# Patient Record
Sex: MALE | Race: WHITE
[De-identification: names, ages, dates, MRNs, and addresses within clinical notes are randomized per-mention and may not be internally consistent; named-entity substitution may affect disease eponyms.]

---

## 2019-06-19 ENCOUNTER — HOSPITAL ENCOUNTER (OUTPATIENT)
Dept: HOSPITAL 94 - RAD | Age: 59
Discharge: HOME | End: 2019-06-19
Attending: INTERNAL MEDICINE
Payer: MEDICAID

## 2019-06-19 DIAGNOSIS — R13.12: Primary | ICD-10-CM

## 2019-06-19 PROCEDURE — 74230 X-RAY XM SWLNG FUNCJ C+: CPT

## 2020-10-07 ENCOUNTER — HOSPITAL ENCOUNTER (OUTPATIENT)
Dept: HOSPITAL 94 - RAD | Age: 60
Discharge: HOME | End: 2020-10-07
Attending: INTERNAL MEDICINE
Payer: MEDICAID

## 2020-10-07 DIAGNOSIS — R13.12: Primary | ICD-10-CM

## 2020-10-07 PROCEDURE — 74230 X-RAY XM SWLNG FUNCJ C+: CPT

## 2020-12-08 ENCOUNTER — HOSPITAL ENCOUNTER (INPATIENT)
Dept: HOSPITAL 94 - ER | Age: 60
LOS: 2 days | Discharge: NURSING, REHAB OR LONG-TERM CARE | DRG: 137 | End: 2020-12-10
Attending: HOSPITALIST | Admitting: SPECIALIST
Payer: MEDICAID

## 2020-12-08 VITALS — WEIGHT: 147.27 LBS | HEIGHT: 70 IN | BODY MASS INDEX: 21.08 KG/M2

## 2020-12-08 VITALS — SYSTOLIC BLOOD PRESSURE: 136 MMHG | DIASTOLIC BLOOD PRESSURE: 81 MMHG

## 2020-12-08 DIAGNOSIS — E78.00: ICD-10-CM

## 2020-12-08 DIAGNOSIS — U07.1: Primary | ICD-10-CM

## 2020-12-08 DIAGNOSIS — F20.9: ICD-10-CM

## 2020-12-08 DIAGNOSIS — Z79.899: ICD-10-CM

## 2020-12-08 DIAGNOSIS — R09.02: ICD-10-CM

## 2020-12-08 DIAGNOSIS — J12.89: ICD-10-CM

## 2020-12-08 DIAGNOSIS — N17.9: ICD-10-CM

## 2020-12-08 DIAGNOSIS — E03.9: ICD-10-CM

## 2020-12-08 DIAGNOSIS — R13.10: ICD-10-CM

## 2020-12-08 LAB
ALBUMIN SERPL BCP-MCNC: 2.4 G/DL (ref 3.4–5)
ALBUMIN/GLOB SERPL: 0.5 {RATIO} (ref 1.1–1.5)
ALP SERPL-CCNC: 120 IU/L (ref 46–116)
ALT SERPL W P-5'-P-CCNC: 21 U/L (ref 12–78)
ANION GAP SERPL CALCULATED.3IONS-SCNC: 7 MMOL/L (ref 8–16)
AST SERPL W P-5'-P-CCNC: 80 U/L (ref 10–37)
BASOPHILS # BLD AUTO: 0 X10'3 (ref 0–0.2)
BASOPHILS NFR BLD AUTO: 0.1 % (ref 0–1)
BILIRUB SERPL-MCNC: 0.3 MG/DL (ref 0.1–1)
BUN SERPL-MCNC: 70 MG/DL (ref 7–18)
BUN/CREAT SERPL: 35.2 (ref 5.4–32)
CALCIUM SERPL-MCNC: 9 MG/DL (ref 8.5–10.1)
CHLORIDE SERPL-SCNC: 110 MMOL/L (ref 99–107)
CO2 SERPL-SCNC: 30.7 MMOL/L (ref 24–32)
CREAT SERPL-MCNC: 1.99 MG/DL (ref 0.6–1.1)
CRP SERPL HS-MCNC: 25.36 MG/DL (ref 0–0.5)
EOSINOPHIL # BLD AUTO: 0 X10'3 (ref 0–0.9)
EOSINOPHIL NFR BLD AUTO: 0.1 % (ref 0–6)
ERYTHROCYTE [DISTWIDTH] IN BLOOD BY AUTOMATED COUNT: 15.8 % (ref 11.5–14.5)
FERRITIN SERPL-MCNC: 356 NG/ML (ref 26–388)
GFR SERPL CREATININE-BSD FRML MDRD: 34 ML/MIN
GLUCOSE SERPL-MCNC: 113 MG/DL (ref 70–104)
HCT VFR BLD AUTO: 32.9 % (ref 42–52)
HGB BLD-MCNC: 10.8 G/DL (ref 14–17.9)
LDH SERPL-CCNC: 211 U/L (ref 85–227)
LYMPHOCYTES # BLD AUTO: 0.5 X10'3 (ref 1.1–4.8)
LYMPHOCYTES NFR BLD AUTO: 4.6 % (ref 21–51)
MCH RBC QN AUTO: 29.2 PG (ref 27–31)
MCHC RBC AUTO-ENTMCNC: 33 G/DL (ref 33–36.5)
MCV RBC AUTO: 88.5 FL (ref 78–98)
MONOCYTES # BLD AUTO: 1.2 X10'3 (ref 0–0.9)
MONOCYTES NFR BLD AUTO: 11.3 % (ref 2–12)
NEUTROPHILS # BLD AUTO: 8.7 X10'3 (ref 1.8–7.7)
NEUTROPHILS NFR BLD AUTO: 83.9 % (ref 42–75)
PLATELET # BLD AUTO: 171 X10'3 (ref 140–440)
PMV BLD AUTO: 10.8 FL (ref 7.4–10.4)
POTASSIUM SERPL-SCNC: 4.8 MMOL/L (ref 3.5–5.1)
PROT SERPL-MCNC: 7.6 G/DL (ref 6.4–8.2)
RBC # BLD AUTO: 3.72 X10'6 (ref 4.7–6.1)
SODIUM SERPL-SCNC: 148 MMOL/L (ref 135–145)
TROPONIN I SERPL-MCNC: < 0.04 NG/ML (ref 0–0.05)
WBC # BLD AUTO: 10.4 X10'3 (ref 4.5–11)

## 2020-12-08 PROCEDURE — 92508 TX SP LANG VOICE COMM GROUP: CPT

## 2020-12-08 PROCEDURE — 85384 FIBRINOGEN ACTIVITY: CPT

## 2020-12-08 PROCEDURE — 82728 ASSAY OF FERRITIN: CPT

## 2020-12-08 PROCEDURE — 96365 THER/PROPH/DIAG IV INF INIT: CPT

## 2020-12-08 PROCEDURE — 84145 PROCALCITONIN (PCT): CPT

## 2020-12-08 PROCEDURE — 87040 BLOOD CULTURE FOR BACTERIA: CPT

## 2020-12-08 PROCEDURE — 92616 FEES W/LARYNGEAL SENSE TEST: CPT

## 2020-12-08 PROCEDURE — 71250 CT THORAX DX C-: CPT

## 2020-12-08 PROCEDURE — 84484 ASSAY OF TROPONIN QUANT: CPT

## 2020-12-08 PROCEDURE — 73502 X-RAY EXAM HIP UNI 2-3 VIEWS: CPT

## 2020-12-08 PROCEDURE — 99285 EMERGENCY DEPT VISIT HI MDM: CPT

## 2020-12-08 PROCEDURE — 96375 TX/PRO/DX INJ NEW DRUG ADDON: CPT

## 2020-12-08 PROCEDURE — 80053 COMPREHEN METABOLIC PANEL: CPT

## 2020-12-08 PROCEDURE — 93005 ELECTROCARDIOGRAM TRACING: CPT

## 2020-12-08 PROCEDURE — 86140 C-REACTIVE PROTEIN: CPT

## 2020-12-08 PROCEDURE — 83615 LACTATE (LD) (LDH) ENZYME: CPT

## 2020-12-08 PROCEDURE — 36415 COLL VENOUS BLD VENIPUNCTURE: CPT

## 2020-12-08 PROCEDURE — 71045 X-RAY EXAM CHEST 1 VIEW: CPT

## 2020-12-08 PROCEDURE — 81001 URINALYSIS AUTO W/SCOPE: CPT

## 2020-12-08 PROCEDURE — 83605 ASSAY OF LACTIC ACID: CPT

## 2020-12-08 PROCEDURE — 87081 CULTURE SCREEN ONLY: CPT

## 2020-12-08 PROCEDURE — 70450 CT HEAD/BRAIN W/O DYE: CPT

## 2020-12-08 PROCEDURE — 85025 COMPLETE CBC W/AUTO DIFF WBC: CPT

## 2020-12-08 RX ADMIN — SODIUM CHLORIDE SCH MLS/HR: 9 INJECTION INTRAMUSCULAR; INTRAVENOUS; SUBCUTANEOUS at 23:00

## 2020-12-08 NOTE — NUR
Patient in room ORTHO 4008. I have received report from LEILANI Tai in ED and had the 
opportunity to ask questions and assume patient care.

## 2020-12-08 NOTE — NUR
While getting patient situated in bed and doing assessment patient tried to hit 2 times and 
cussed me out. Redirected patient and asked him not to behave that way.

## 2020-12-09 VITALS — DIASTOLIC BLOOD PRESSURE: 68 MMHG | SYSTOLIC BLOOD PRESSURE: 158 MMHG

## 2020-12-09 VITALS — DIASTOLIC BLOOD PRESSURE: 79 MMHG | SYSTOLIC BLOOD PRESSURE: 133 MMHG

## 2020-12-09 VITALS — DIASTOLIC BLOOD PRESSURE: 66 MMHG | SYSTOLIC BLOOD PRESSURE: 120 MMHG

## 2020-12-09 LAB
ALBUMIN SERPL BCP-MCNC: 2 G/DL (ref 3.4–5)
ALBUMIN/GLOB SERPL: 0.4 {RATIO} (ref 1.1–1.5)
ALP SERPL-CCNC: 101 IU/L (ref 46–116)
ALT SERPL W P-5'-P-CCNC: 37 U/L (ref 12–78)
ANION GAP SERPL CALCULATED.3IONS-SCNC: 7 MMOL/L (ref 8–16)
AST SERPL W P-5'-P-CCNC: 64 U/L (ref 10–37)
BACTERIA URNS QL MICRO: (no result) /HPF
BASOPHILS # BLD AUTO: 0 X10'3 (ref 0–0.2)
BASOPHILS NFR BLD AUTO: 0.1 % (ref 0–1)
BILIRUB SERPL-MCNC: 0.3 MG/DL (ref 0.1–1)
BUN SERPL-MCNC: 54 MG/DL (ref 7–18)
BUN/CREAT SERPL: 55.7 (ref 5.4–32)
CALCIUM SERPL-MCNC: 9.1 MG/DL (ref 8.5–10.1)
CHLORIDE SERPL-SCNC: 116 MMOL/L (ref 99–107)
CLARITY UR: CLEAR
CO2 SERPL-SCNC: 27.8 MMOL/L (ref 24–32)
COLOR UR: YELLOW
CREAT SERPL-MCNC: 0.97 MG/DL (ref 0.6–1.1)
DEPRECATED SQUAMOUS URNS QL MICRO: (no result) /LPF
EOSINOPHIL # BLD AUTO: 0 X10'3 (ref 0–0.9)
EOSINOPHIL NFR BLD AUTO: 0 % (ref 0–6)
ERYTHROCYTE [DISTWIDTH] IN BLOOD BY AUTOMATED COUNT: 15.3 % (ref 11.5–14.5)
GFR SERPL CREATININE-BSD FRML MDRD: 79 ML/MIN
GLUCOSE SERPL-MCNC: 126 MG/DL (ref 70–104)
GLUCOSE UR STRIP-MCNC: NEGATIVE MG/DL
HCT VFR BLD AUTO: 28.5 % (ref 42–52)
HGB BLD-MCNC: 9.5 G/DL (ref 14–17.9)
HGB UR QL STRIP: (no result)
KETONES UR STRIP-MCNC: NEGATIVE MG/DL
LEUKOCYTE ESTERASE UR QL STRIP: NEGATIVE
LYMPHOCYTES # BLD AUTO: 0.2 X10'3 (ref 1.1–4.8)
LYMPHOCYTES NFR BLD AUTO: 4.4 % (ref 21–51)
MCH RBC QN AUTO: 30 PG (ref 27–31)
MCHC RBC AUTO-ENTMCNC: 33.4 G/DL (ref 33–36.5)
MCV RBC AUTO: 89.8 FL (ref 78–98)
MONOCYTES # BLD AUTO: 0.4 X10'3 (ref 0–0.9)
MONOCYTES NFR BLD AUTO: 7.8 % (ref 2–12)
NEUTROPHILS # BLD AUTO: 4.9 X10'3 (ref 1.8–7.7)
NEUTROPHILS NFR BLD AUTO: 87.7 % (ref 42–75)
NITRITE UR QL STRIP: NEGATIVE
PH UR STRIP: 6 [PH] (ref 4.8–8)
PLATELET # BLD AUTO: 158 X10'3 (ref 140–440)
PMV BLD AUTO: 10.1 FL (ref 7.4–10.4)
POTASSIUM SERPL-SCNC: 4.3 MMOL/L (ref 3.5–5.1)
PROT SERPL-MCNC: 6.9 G/DL (ref 6.4–8.2)
PROT UR QL STRIP: NEGATIVE MG/DL
RBC # BLD AUTO: 3.17 X10'6 (ref 4.7–6.1)
RBC #/AREA URNS HPF: (no result) /HPF (ref 0–2)
SODIUM SERPL-SCNC: 151 MMOL/L (ref 135–145)
SP GR UR STRIP: 1.02 (ref 1–1.03)
URN COLLECT METHOD CLASS: (no result)
UROBILINOGEN UR STRIP-MCNC: 1 E.U/DL (ref 0.2–1)
WBC # BLD AUTO: 5.5 X10'3 (ref 4.5–11)
WBC #/AREA URNS HPF: (no result) /HPF (ref 0–4)

## 2020-12-09 RX ADMIN — DEXTROSE SCH MLS/HR: 50 INJECTION, SOLUTION INTRAVENOUS at 10:20

## 2020-12-09 RX ADMIN — SODIUM CHLORIDE SCH MLS/HR: 9 INJECTION INTRAMUSCULAR; INTRAVENOUS; SUBCUTANEOUS at 06:30

## 2020-12-09 RX ADMIN — SODIUM CHLORIDE SCH MLS/HR: 9 INJECTION INTRAMUSCULAR; INTRAVENOUS; SUBCUTANEOUS at 14:30

## 2020-12-09 RX ADMIN — CEFTRIAXONE SCH MLS/HR: 1 INJECTION, SOLUTION INTRAVENOUS at 09:04

## 2020-12-09 NOTE — NUR
Problems reprioritized. Patient report given, questions answered & plan of care reviewed 
with LEILANI Hubbard.

## 2020-12-09 NOTE — NUR
PAGER ID:  [phone] 

MESSAGE:  [phone]- Lizbeth Bowman- did you want to have a chest ct scan, per the cxray 
report? [phone]

## 2020-12-09 NOTE — NUR
Problems reprioritized. Patient report given, questions answered & plan of care reviewed 
with Inna DUKE.

## 2020-12-09 NOTE — NUR
Pt was incontinent, while changing him he grabbed my wrist tightly. He was asked to stop 
several times before he letting go of my wrist.

## 2020-12-10 VITALS — DIASTOLIC BLOOD PRESSURE: 71 MMHG | SYSTOLIC BLOOD PRESSURE: 158 MMHG

## 2020-12-10 VITALS — DIASTOLIC BLOOD PRESSURE: 88 MMHG | SYSTOLIC BLOOD PRESSURE: 140 MMHG

## 2020-12-10 LAB
ALBUMIN SERPL BCP-MCNC: 2.1 G/DL (ref 3.4–5)
ALBUMIN/GLOB SERPL: 0.5 {RATIO} (ref 1.1–1.5)
ALP SERPL-CCNC: 92 IU/L (ref 46–116)
ALT SERPL W P-5'-P-CCNC: 45 U/L (ref 12–78)
ANION GAP SERPL CALCULATED.3IONS-SCNC: 5 MMOL/L (ref 8–16)
AST SERPL W P-5'-P-CCNC: 52 U/L (ref 10–37)
BASOPHILS # BLD AUTO: 0 X10'3 (ref 0–0.2)
BASOPHILS NFR BLD AUTO: 0 % (ref 0–1)
BILIRUB SERPL-MCNC: 0.4 MG/DL (ref 0.1–1)
BUN SERPL-MCNC: 40 MG/DL (ref 7–18)
BUN/CREAT SERPL: 50.6 (ref 5.4–32)
CALCIUM SERPL-MCNC: 9.1 MG/DL (ref 8.5–10.1)
CHLORIDE SERPL-SCNC: 119 MMOL/L (ref 99–107)
CO2 SERPL-SCNC: 30.1 MMOL/L (ref 24–32)
CREAT SERPL-MCNC: 0.79 MG/DL (ref 0.6–1.1)
EOSINOPHIL # BLD AUTO: 0 X10'3 (ref 0–0.9)
EOSINOPHIL NFR BLD AUTO: 0 % (ref 0–6)
ERYTHROCYTE [DISTWIDTH] IN BLOOD BY AUTOMATED COUNT: 15.3 % (ref 11.5–14.5)
GFR SERPL CREATININE-BSD FRML MDRD: > 90 ML/MIN
GLUCOSE SERPL-MCNC: 95 MG/DL (ref 70–104)
HCT VFR BLD AUTO: 27 % (ref 42–52)
HGB BLD-MCNC: 9 G/DL (ref 14–17.9)
LYMPHOCYTES # BLD AUTO: 0.5 X10'3 (ref 1.1–4.8)
LYMPHOCYTES NFR BLD AUTO: 5.9 % (ref 21–51)
MCH RBC QN AUTO: 29.5 PG (ref 27–31)
MCHC RBC AUTO-ENTMCNC: 33.2 G/DL (ref 33–36.5)
MCV RBC AUTO: 88.7 FL (ref 78–98)
MONOCYTES # BLD AUTO: 0.9 X10'3 (ref 0–0.9)
MONOCYTES NFR BLD AUTO: 10.8 % (ref 2–12)
NEUTROPHILS # BLD AUTO: 6.9 X10'3 (ref 1.8–7.7)
NEUTROPHILS NFR BLD AUTO: 83.3 % (ref 42–75)
PLATELET # BLD AUTO: 244 X10'3 (ref 140–440)
PMV BLD AUTO: 9.3 FL (ref 7.4–10.4)
POTASSIUM SERPL-SCNC: 3.3 MMOL/L (ref 3.5–5.1)
PROT SERPL-MCNC: 6.7 G/DL (ref 6.4–8.2)
RBC # BLD AUTO: 3.05 X10'6 (ref 4.7–6.1)
SODIUM SERPL-SCNC: 154 MMOL/L (ref 135–145)
WBC # BLD AUTO: 8.3 X10'3 (ref 4.5–11)

## 2020-12-10 RX ADMIN — CEFTRIAXONE SCH MLS/HR: 1 INJECTION, SOLUTION INTRAVENOUS at 08:31

## 2020-12-10 RX ADMIN — SODIUM CHLORIDE SCH MLS/HR: 9 INJECTION INTRAMUSCULAR; INTRAVENOUS; SUBCUTANEOUS at 02:30

## 2020-12-10 RX ADMIN — DEXTROSE SCH MLS/HR: 50 INJECTION, SOLUTION INTRAVENOUS at 08:31

## 2020-12-10 RX ADMIN — SODIUM CHLORIDE SCH MLS/HR: 9 INJECTION INTRAMUSCULAR; INTRAVENOUS; SUBCUTANEOUS at 12:30

## 2020-12-10 NOTE — NUR
SPOKE TO RUSU RECEIVED ORDER FOR ATIVAN .5 MG DUE TO PT AGITATION AND HE STATES NA LEVEL NOT 
NEEDED.

## 2021-03-18 ENCOUNTER — HOSPITAL ENCOUNTER (EMERGENCY)
Dept: HOSPITAL 94 - ER | Age: 61
LOS: 1 days | End: 2021-03-19
Payer: MEDICAID

## 2021-03-18 VITALS — SYSTOLIC BLOOD PRESSURE: 197 MMHG | DIASTOLIC BLOOD PRESSURE: 118 MMHG

## 2021-03-18 VITALS — WEIGHT: 120.26 LBS | HEIGHT: 68 IN | BODY MASS INDEX: 18.23 KG/M2

## 2021-03-18 DIAGNOSIS — Z98.890: ICD-10-CM

## 2021-03-18 DIAGNOSIS — Z79.899: ICD-10-CM

## 2021-03-18 DIAGNOSIS — F20.9: ICD-10-CM

## 2021-03-18 DIAGNOSIS — Z86.19: ICD-10-CM

## 2021-03-18 DIAGNOSIS — K21.9: ICD-10-CM

## 2021-03-18 DIAGNOSIS — E78.00: ICD-10-CM

## 2021-03-18 DIAGNOSIS — H57.04: Primary | ICD-10-CM

## 2021-03-18 DIAGNOSIS — Z86.69: ICD-10-CM

## 2021-03-18 PROCEDURE — 94760 N-INVAS EAR/PLS OXIMETRY 1: CPT

## 2021-03-18 PROCEDURE — 31500 INSERT EMERGENCY AIRWAY: CPT

## 2021-03-18 PROCEDURE — 94799 UNLISTED PULMONARY SVC/PX: CPT

## 2021-03-18 PROCEDURE — 99285 EMERGENCY DEPT VISIT HI MDM: CPT

## 2021-03-18 NOTE — NUR
PUBLIC GUARDIAN CALLED BACK, PRE ARRANGEMENTS HAVE BEEN MADE AT [facility] IN [place].  SOMEONE NAMED [facility] IS SUPPOSED TO CALL FROM 
THEIR FACILITY.  PUBLIC GUARDIAN WILL CALL US BACK WITH AN ETA FOR TRANSPORT OF 
THE BODY.

## 2021-03-18 NOTE — NUR
PTS BROTHER CALLED (NICOLE GARCIA) [phone] - HE WANTS HIS BROTHER TO BE 
CREMATED AND THEN SENT TO [facility] IN [place].  HE STATES HE IS FINE USING 
ANY LOCAL FACILITY THAT DOES CREMATION.  PLEASE CALL HIM WHEN WE FIND A 
FACILITY.

## 2021-03-18 NOTE — NUR
spoke with simon lyon of [facility] where pt was staying and 
informed her of pt expiration.

## 2021-03-18 NOTE — NUR
pt was found unresponsive by facility, apneic and asystole. unwitnessed. cpr 
began at facility



2015 last epi by ems (6 epi's pta by ems)

2017 arrival by ems, cpr in progress, asystole/pea 

per ems, called TOD on scene, had ROSC, pt coded again, ROSC again, coded in 
route again to [facility] facility

2021 epi, na bicarb, calcium chloride push

20[facility] 1 attempt

pt in afib rate 98

2024 narcan 2mg 

pt in sinus rhythm 40bpm

2029 epi

2030 na bicarb, calcium chloride

2033 Time of Death- EDMD [place]

## 2021-03-18 NOTE — NUR
PUBLIC GUARDIAN CALLED FOR UPDATE - PIA SCANLON (PAGER) [phone], (ANSWERING 
SERVICE) [phone] - TRY PAGER FIRST

## 2021-03-18 NOTE — NUR
spoke with public guardian. [facility]  
[facility] remains in approx 3 hours. charge ranulfo hernandez made aware.

## 2021-03-18 NOTE — NUR
Pt intubated in the field with an igel. Pt received with cpr in progress, reintubated with 
an 8.0 et tube, 24 cm at the teeth with good color change and breath sounds assessed. 
Obtained ROSC, but then lost pulses again, cpr resumed. Code subsequently called at , pt 


-------------------------------------------------------------------------------

Addendum: 21 at  by Any Egan RT

-------------------------------------------------------------------------------

Amended: Links added.